# Patient Record
Sex: FEMALE | Race: WHITE | NOT HISPANIC OR LATINO | Employment: FULL TIME | URBAN - NONMETROPOLITAN AREA
[De-identification: names, ages, dates, MRNs, and addresses within clinical notes are randomized per-mention and may not be internally consistent; named-entity substitution may affect disease eponyms.]

---

## 2018-12-21 ENCOUNTER — OFFICE VISIT (OUTPATIENT)
Dept: FAMILY MEDICINE CLINIC | Facility: CLINIC | Age: 23
End: 2018-12-21

## 2018-12-21 VITALS
SYSTOLIC BLOOD PRESSURE: 158 MMHG | WEIGHT: 224 LBS | DIASTOLIC BLOOD PRESSURE: 116 MMHG | BODY MASS INDEX: 37.32 KG/M2 | HEIGHT: 65 IN

## 2018-12-21 DIAGNOSIS — I10 ESSENTIAL HYPERTENSION: Primary | ICD-10-CM

## 2018-12-21 DIAGNOSIS — R09.81 NASAL CONGESTION: ICD-10-CM

## 2018-12-21 PROBLEM — Z87.442 HISTORY OF KIDNEY STONES: Status: ACTIVE | Noted: 2018-12-21

## 2018-12-21 PROCEDURE — 99214 OFFICE O/P EST MOD 30 MIN: CPT | Performed by: FAMILY MEDICINE

## 2018-12-21 RX ORDER — LISINOPRIL 10 MG/1
10 TABLET ORAL DAILY
Qty: 30 TABLET | Refills: 5 | Status: SHIPPED | OUTPATIENT
Start: 2018-12-21 | End: 2019-03-07 | Stop reason: ALTCHOICE

## 2018-12-21 NOTE — PROGRESS NOTES
Subjective   Savanah Orozco is a 23 y.o. female who presents to the office to establish care.  Her blood pressure has been running extremely high.  She hasn't had any recent changes or started any new medications.  She also complains about some nasal congestion.    She has a very strong family history of heart disease, thyroid disease, and hypertension.  She has noted that she's gained quite a bit of weight recently.  She had some stress last summer and has put on about 20 pounds.    History of Present Illness   Hypertension   This is a new problem.  The problem has been waxing and waning since onset. Associated symptoms include anxiety, headaches and malaise/fatigue. Pertinent negatives include no blurred vision, chest pain, neck pain, orthopnea, palpitations, peripheral edema, PND, shortness of breath or sweats. There are no associated agents to hypertension. Risk factors for coronary artery disease include dyslipidemia and family history.     The following portions of the patient's history were reviewed and updated as appropriate: allergies, current medications, past family history, past medical history, past social history, past surgical history and problem list.    Review of Systems   Constitutional: Negative.    HENT: Negative.    Respiratory: Negative.  Negative for shortness of breath.    Cardiovascular: Negative.  Negative for chest pain.   Gastrointestinal: Negative.    Musculoskeletal: Negative.  Negative for myalgias.   Skin: Negative.    Allergic/Immunologic: Negative for immunocompromised state.   Neurological: Negative for dizziness, tremors, seizures, syncope, weakness and numbness.   Hematological: Negative.    Psychiatric/Behavioral: Negative for agitation, confusion, dysphoric mood and sleep disturbance. The patient is not nervous/anxious.    All other systems reviewed and are negative.      Objective   Physical Exam   Constitutional: She is oriented to person, place, and time. She appears  well-developed and well-nourished.   HENT:   Head: Normocephalic and atraumatic.   Nose: Nose normal.   Mouth/Throat: Oropharynx is clear and moist.   Eyes: Conjunctivae and EOM are normal. Pupils are equal, round, and reactive to light.   Neck: Normal range of motion. Neck supple. No JVD present. No tracheal deviation present. No thyromegaly present.   Cardiovascular: Normal rate, regular rhythm, normal heart sounds and intact distal pulses.   No murmur heard.  Pulmonary/Chest: Effort normal and breath sounds normal. She has no wheezes.   Abdominal: Soft. Bowel sounds are normal. She exhibits no distension. There is no tenderness.   Musculoskeletal: Normal range of motion. She exhibits no edema.   Lymphadenopathy:     She has no cervical adenopathy.   Neurological: She is alert and oriented to person, place, and time. Coordination normal.   Skin: Skin is warm and dry. No rash noted.   Psychiatric: She has a normal mood and affect.   Nursing note and vitals reviewed.      Assessment/Plan   Savanah was seen today for establish care.    Diagnoses and all orders for this visit:    Essential hypertension  -     CBC & Differential; Future  -     Comprehensive Metabolic Panel  -     TSH  -     T4, Free  -     Lipid Panel; Future    Nasal congestion    Other orders  -     lisinopril (PRINIVIL,ZESTRIL) 10 MG tablet; Take 1 tablet by mouth Daily.    We'll start lisinopril for hypertension and get labs as above.  We'll have her return in 2 weeks for results and consider further workup of blood pressure now well controlled.  She is advised to go straight to the emergency room or call 911 for any signs of acute neurologic change.    Recommend over-the-counter Flonase or Nasacort for the nasal congestion as this should not disturb her blood pressure.

## 2018-12-24 ENCOUNTER — LAB (OUTPATIENT)
Dept: LAB | Facility: OTHER | Age: 23
End: 2018-12-24

## 2018-12-24 DIAGNOSIS — I10 ESSENTIAL HYPERTENSION: ICD-10-CM

## 2018-12-24 LAB
ALBUMIN SERPL-MCNC: 4.3 G/DL (ref 3.5–5)
ALBUMIN/GLOB SERPL: 1.4 G/DL (ref 1.1–1.8)
ALP SERPL-CCNC: 84 U/L (ref 38–126)
ALT SERPL W P-5'-P-CCNC: 17 U/L
ANION GAP SERPL CALCULATED.3IONS-SCNC: 7 MMOL/L (ref 5–15)
AST SERPL-CCNC: 20 U/L (ref 14–36)
BASOPHILS # BLD AUTO: 0.04 10*3/MM3 (ref 0–0.2)
BASOPHILS NFR BLD AUTO: 0.5 % (ref 0–2)
BILIRUB SERPL-MCNC: 0.4 MG/DL (ref 0.2–1.3)
BUN BLD-MCNC: 12 MG/DL (ref 7–17)
BUN/CREAT SERPL: 16.2 (ref 7–25)
CALCIUM SPEC-SCNC: 9.3 MG/DL (ref 8.4–10.2)
CHLORIDE SERPL-SCNC: 110 MMOL/L (ref 98–107)
CHOLEST SERPL-MCNC: 178 MG/DL (ref 150–200)
CO2 SERPL-SCNC: 24 MMOL/L (ref 22–30)
CREAT BLD-MCNC: 0.74 MG/DL (ref 0.52–1.04)
DEPRECATED RDW RBC AUTO: 38.4 FL (ref 36.4–46.3)
EOSINOPHIL # BLD AUTO: 0.44 10*3/MM3 (ref 0–0.7)
EOSINOPHIL NFR BLD AUTO: 5.2 % (ref 0–7)
ERYTHROCYTE [DISTWIDTH] IN BLOOD BY AUTOMATED COUNT: 12.7 % (ref 11.5–14.5)
GFR SERPL CREATININE-BSD FRML MDRD: 96 ML/MIN/1.73 (ref 71–165)
GLOBULIN UR ELPH-MCNC: 3.1 GM/DL (ref 2.3–3.5)
GLUCOSE BLD-MCNC: 113 MG/DL (ref 74–99)
HCT VFR BLD AUTO: 41.4 % (ref 35–45)
HDLC SERPL-MCNC: 63 MG/DL (ref 40–59)
HGB BLD-MCNC: 13.6 G/DL (ref 12–15.5)
LDLC SERPL CALC-MCNC: 99 MG/DL
LDLC/HDLC SERPL: 1.58 {RATIO} (ref 0–3.22)
LYMPHOCYTES # BLD AUTO: 3.06 10*3/MM3 (ref 0.6–4.2)
LYMPHOCYTES NFR BLD AUTO: 36.3 % (ref 10–50)
MCH RBC QN AUTO: 28.1 PG (ref 26.5–34)
MCHC RBC AUTO-ENTMCNC: 32.9 G/DL (ref 31.4–36)
MCV RBC AUTO: 85.5 FL (ref 80–98)
MONOCYTES # BLD AUTO: 0.81 10*3/MM3 (ref 0–0.9)
MONOCYTES NFR BLD AUTO: 9.6 % (ref 0–12)
NEUTROPHILS # BLD AUTO: 4.09 10*3/MM3 (ref 2–8.6)
NEUTROPHILS NFR BLD AUTO: 48.4 % (ref 37–80)
PLATELET # BLD AUTO: 381 10*3/MM3 (ref 150–450)
PMV BLD AUTO: 10 FL (ref 8–12)
POTASSIUM BLD-SCNC: 4.4 MMOL/L (ref 3.4–5)
PROT SERPL-MCNC: 7.4 G/DL (ref 6.3–8.2)
RBC # BLD AUTO: 4.84 10*6/MM3 (ref 3.77–5.16)
SODIUM BLD-SCNC: 141 MMOL/L (ref 137–145)
T4 FREE SERPL-MCNC: 0.91 NG/DL (ref 0.78–2.19)
TRIGL SERPL-MCNC: 78 MG/DL
TSH SERPL DL<=0.05 MIU/L-ACNC: 2.78 MIU/ML (ref 0.46–4.68)
VLDLC SERPL-MCNC: 15.6 MG/DL
WBC NRBC COR # BLD: 8.44 10*3/MM3 (ref 3.2–9.8)

## 2018-12-24 PROCEDURE — 84439 ASSAY OF FREE THYROXINE: CPT | Performed by: FAMILY MEDICINE

## 2018-12-24 PROCEDURE — 80061 LIPID PANEL: CPT | Performed by: FAMILY MEDICINE

## 2018-12-24 PROCEDURE — 80053 COMPREHEN METABOLIC PANEL: CPT | Performed by: FAMILY MEDICINE

## 2018-12-24 PROCEDURE — 36415 COLL VENOUS BLD VENIPUNCTURE: CPT | Performed by: FAMILY MEDICINE

## 2018-12-24 PROCEDURE — 84443 ASSAY THYROID STIM HORMONE: CPT | Performed by: FAMILY MEDICINE

## 2018-12-24 PROCEDURE — 85025 COMPLETE CBC W/AUTO DIFF WBC: CPT | Performed by: FAMILY MEDICINE

## 2019-01-03 ENCOUNTER — OFFICE VISIT (OUTPATIENT)
Dept: FAMILY MEDICINE CLINIC | Facility: CLINIC | Age: 24
End: 2019-01-03

## 2019-01-03 VITALS
SYSTOLIC BLOOD PRESSURE: 154 MMHG | BODY MASS INDEX: 37.32 KG/M2 | HEIGHT: 65 IN | DIASTOLIC BLOOD PRESSURE: 110 MMHG | WEIGHT: 224 LBS

## 2019-01-03 DIAGNOSIS — I10 ESSENTIAL HYPERTENSION: Primary | ICD-10-CM

## 2019-01-03 PROCEDURE — 99213 OFFICE O/P EST LOW 20 MIN: CPT | Performed by: FAMILY MEDICINE

## 2019-01-03 RX ORDER — AMLODIPINE BESYLATE 5 MG/1
5 TABLET ORAL DAILY
Qty: 30 TABLET | Refills: 5 | Status: SHIPPED | OUTPATIENT
Start: 2019-01-03 | End: 2019-03-07

## 2019-01-03 NOTE — PROGRESS NOTES
Subjective   Savanah Orozco is a 23 y.o. female who presents to the office to follow-up on hypertension.  She's been on lisinopril 10 mg taking it daily her blood pressure has continued to be elevated.  She has not been checking it at home.    History of Present Illness   Hypertension   This is a new problem.  The problem has been waxing and waning since onset. Associated symptoms include anxiety, headaches and malaise/fatigue. Pertinent negatives include no blurred vision, chest pain, neck pain, orthopnea, palpitations, peripheral edema, PND, shortness of breath or sweats. There are no associated agents to hypertension. Risk factors for coronary artery disease include dyslipidemia and family history.     The following portions of the patient's history were reviewed and updated as appropriate: allergies, current medications, past family history, past medical history, past social history, past surgical history and problem list.    Review of Systems   Constitutional: Negative.    HENT: Negative.    Respiratory: Negative.  Negative for shortness of breath.    Cardiovascular: Negative.  Negative for chest pain.   Gastrointestinal: Negative.    Musculoskeletal: Negative.  Negative for myalgias.   Skin: Negative.    Allergic/Immunologic: Negative for immunocompromised state.   Neurological: Negative for dizziness, tremors, seizures, syncope, weakness and numbness.   Hematological: Negative.    Psychiatric/Behavioral: Negative for agitation, confusion, dysphoric mood and sleep disturbance. The patient is not nervous/anxious.    All other systems reviewed and are negative.      Objective   Physical Exam   Constitutional: She is oriented to person, place, and time. She appears well-developed and well-nourished.   HENT:   Head: Normocephalic and atraumatic.   Nose: Nose normal.   Mouth/Throat: Oropharynx is clear and moist.   Eyes: Conjunctivae and EOM are normal. Pupils are equal, round, and reactive to light.   Neck:  Normal range of motion. Neck supple. No JVD present. No tracheal deviation present. No thyromegaly present.   Cardiovascular: Normal rate, regular rhythm, normal heart sounds and intact distal pulses.   No murmur heard.  Pulmonary/Chest: Effort normal and breath sounds normal. She has no wheezes.   Abdominal: Soft. Bowel sounds are normal. She exhibits no distension. There is no tenderness.   Musculoskeletal: Normal range of motion. She exhibits no edema.   Lymphadenopathy:     She has no cervical adenopathy.   Neurological: She is alert and oriented to person, place, and time. Coordination normal.   Skin: Skin is warm and dry. No rash noted.   Psychiatric: She has a normal mood and affect.   Nursing note and vitals reviewed.      Assessment/Plan   Savanah was seen today for follow-up.    Diagnoses and all orders for this visit:    Essential hypertension  -     CT Angiogram Renal; Future    Other orders  -     amLODIPine (NORVASC) 5 MG tablet; Take 1 tablet by mouth Daily.    Will order the CT of the renal arteries, and add amlodipine for hypertension and check back with me in 2 weeks or sooner for problems.  Patient is advised to seem immediate medical attention for any acute neurologic changes or stroke-like symptoms.            This document has been electronically signed by Liliana Lowery MD on January 3, 2019 3:42 PM

## 2019-01-17 DIAGNOSIS — Z32.00 ENCOUNTER FOR PREGNANCY TEST, RESULT UNKNOWN: Primary | ICD-10-CM

## 2019-01-18 ENCOUNTER — HOSPITAL ENCOUNTER (OUTPATIENT)
Dept: CT IMAGING | Facility: HOSPITAL | Age: 24
Discharge: HOME | End: 2019-01-18
Admitting: FAMILY MEDICINE

## 2019-01-18 ENCOUNTER — LAB (OUTPATIENT)
Dept: LAB | Facility: HOSPITAL | Age: 24
End: 2019-01-18

## 2019-01-18 DIAGNOSIS — Z32.00 ENCOUNTER FOR PREGNANCY TEST, RESULT UNKNOWN: ICD-10-CM

## 2019-01-18 DIAGNOSIS — I10 ESSENTIAL HYPERTENSION: ICD-10-CM

## 2019-01-18 LAB — B-HCG UR QL: NEGATIVE

## 2019-01-18 PROCEDURE — 0 IOPAMIDOL PER 1 ML: Performed by: FAMILY MEDICINE

## 2019-01-18 PROCEDURE — 74175 CTA ABDOMEN W/CONTRAST: CPT

## 2019-01-18 PROCEDURE — 81025 URINE PREGNANCY TEST: CPT

## 2019-01-18 RX ADMIN — IOPAMIDOL 90 ML: 755 INJECTION, SOLUTION INTRAVENOUS at 14:10

## 2019-01-25 ENCOUNTER — OFFICE VISIT (OUTPATIENT)
Dept: FAMILY MEDICINE CLINIC | Facility: CLINIC | Age: 24
End: 2019-01-25

## 2019-01-25 VITALS
WEIGHT: 224 LBS | SYSTOLIC BLOOD PRESSURE: 140 MMHG | DIASTOLIC BLOOD PRESSURE: 98 MMHG | HEIGHT: 65 IN | BODY MASS INDEX: 37.32 KG/M2

## 2019-01-25 DIAGNOSIS — R10.2 PELVIC PAIN: ICD-10-CM

## 2019-01-25 DIAGNOSIS — I10 ESSENTIAL HYPERTENSION: Primary | ICD-10-CM

## 2019-01-25 PROCEDURE — 99214 OFFICE O/P EST MOD 30 MIN: CPT | Performed by: FAMILY MEDICINE

## 2019-01-25 RX ORDER — AMLODIPINE BESYLATE AND BENAZEPRIL HYDROCHLORIDE 5; 20 MG/1; MG/1
1 CAPSULE ORAL DAILY
Qty: 30 CAPSULE | Refills: 5 | Status: SHIPPED | OUTPATIENT
Start: 2019-01-25 | End: 2019-03-04 | Stop reason: SDUPTHER

## 2019-01-25 NOTE — PROGRESS NOTES
Subjective   Savanah Orozco is a 23 y.o. female who presents to the office to follow-up on hypertension.  She's on amlodipine 5 mg, lisinopril 10.  The blood pressure is improved but is still not to goal.    Also she has been having some pelvic pain on the left side.  She has history of some ovarian cysts.  The pain is worse at night but it persists all day and has been present for months.  She has had some irregular menstrual cycles.  History of Present Illness   Hypertension   This is a new problem.  The problem has been waxing and waning since onset. Associated symptoms include anxiety, headaches and malaise/fatigue. Pertinent negatives include no blurred vision, chest pain, neck pain, orthopnea, palpitations, peripheral edema, PND, shortness of breath or sweats. There are no associated agents to hypertension. Risk factors for coronary artery disease include dyslipidemia and family history.     The following portions of the patient's history were reviewed and updated as appropriate: allergies, current medications, past family history, past medical history, past social history, past surgical history and problem list.    Review of Systems   Constitutional: Negative.    HENT: Negative.    Respiratory: Negative.  Negative for shortness of breath.    Cardiovascular: Negative.  Negative for chest pain.   Gastrointestinal: Negative.    Musculoskeletal: Negative.  Negative for myalgias.   Skin: Negative.    Allergic/Immunologic: Negative for immunocompromised state.   Neurological: Negative for dizziness, tremors, seizures, syncope, weakness and numbness.   Hematological: Negative.    Psychiatric/Behavioral: Negative for agitation, confusion, dysphoric mood and sleep disturbance. The patient is not nervous/anxious.    All other systems reviewed and are negative.      Objective   Physical Exam   Constitutional: She is oriented to person, place, and time. She appears well-developed and well-nourished.   HENT:   Head:  Normocephalic and atraumatic.   Nose: Nose normal.   Mouth/Throat: Oropharynx is clear and moist.   Eyes: Conjunctivae and EOM are normal. Pupils are equal, round, and reactive to light.   Neck: Normal range of motion. Neck supple. No JVD present. No tracheal deviation present. No thyromegaly present.   Cardiovascular: Normal rate, regular rhythm, normal heart sounds and intact distal pulses.   No murmur heard.  Pulmonary/Chest: Effort normal and breath sounds normal. She has no wheezes.   Abdominal: Soft. Bowel sounds are normal. She exhibits no distension. There is no tenderness.   Musculoskeletal: Normal range of motion. She exhibits no edema.   Lymphadenopathy:     She has no cervical adenopathy.   Neurological: She is alert and oriented to person, place, and time. Coordination normal.   Skin: Skin is warm and dry. No rash noted.   Psychiatric: She has a normal mood and affect.   Nursing note and vitals reviewed.      Assessment/Plan   Savanah was seen today for follow-up.    Diagnoses and all orders for this visit:    Essential hypertension    Pelvic pain  -     US Pelvis Complete; Future    Other orders  -     amLODIPine-benazepril (LOTREL) 5-20 MG per capsule; Take 1 capsule by mouth Daily.    Switch to lotrel with an increase in the ACE inhibitor.  Recheck blood pressure in a month.  She's gone to get a cuff and followed at home as well    We'll order ultrasound of the pelvis and follow-up with me afterwards concerning the pelvic pain.           This document has been electronically signed by Liliana Lowery MD on January 25, 2019 8:39 AM

## 2019-03-05 RX ORDER — AMLODIPINE BESYLATE AND BENAZEPRIL HYDROCHLORIDE 5; 20 MG/1; MG/1
1 CAPSULE ORAL DAILY
Qty: 30 CAPSULE | Refills: 5 | Status: SHIPPED | OUTPATIENT
Start: 2019-03-05 | End: 2019-08-27 | Stop reason: SDUPTHER

## 2019-03-07 ENCOUNTER — OFFICE VISIT (OUTPATIENT)
Dept: FAMILY MEDICINE CLINIC | Facility: CLINIC | Age: 24
End: 2019-03-07

## 2019-03-07 VITALS
WEIGHT: 224 LBS | HEIGHT: 65 IN | DIASTOLIC BLOOD PRESSURE: 94 MMHG | BODY MASS INDEX: 37.32 KG/M2 | SYSTOLIC BLOOD PRESSURE: 142 MMHG

## 2019-03-07 DIAGNOSIS — I10 ESSENTIAL HYPERTENSION: Primary | ICD-10-CM

## 2019-03-07 PROCEDURE — 99213 OFFICE O/P EST LOW 20 MIN: CPT | Performed by: FAMILY MEDICINE

## 2019-03-07 RX ORDER — METOPROLOL SUCCINATE 50 MG/1
50 TABLET, EXTENDED RELEASE ORAL DAILY
Qty: 30 TABLET | Refills: 5 | Status: SHIPPED | OUTPATIENT
Start: 2019-03-07 | End: 2019-04-12 | Stop reason: SDUPTHER

## 2019-03-07 NOTE — PROGRESS NOTES
Subjective   Savanah Orozco is a 23 y.o. female who presents to the office to follow-up on hypertension.  Blood pressure is still elevated.  She has had a CT angiogram of the renal arteries which was negative.  There is a very strong family history of hypertension.  She has been taking her medicine as directed.  When she checks it at home is running about the same as it is here, still high.  She said mild swelling of the ankles since on the amlodipine.  She does have a history of kidney stones and thus we are trying to avoid diuretics.    History of Present Illness   Hypertension   This is a new problem.  The problem has been waxing and waning since onset. Associated symptoms include anxiety, headaches and malaise/fatigue. Pertinent negatives include no blurred vision, chest pain, neck pain, orthopnea, palpitations, peripheral edema, PND, shortness of breath or sweats. There are no associated agents to hypertension. Risk factors for coronary artery disease include dyslipidemia and family history.     The following portions of the patient's history were reviewed and updated as appropriate: allergies, current medications, past family history, past medical history, past social history, past surgical history and problem list.    Review of Systems   Constitutional: Negative.    HENT: Negative.    Respiratory: Negative.  Negative for shortness of breath.    Cardiovascular: Negative.  Negative for chest pain.   Gastrointestinal: Negative.    Musculoskeletal: Negative.  Negative for myalgias.   Skin: Negative.    Allergic/Immunologic: Negative for immunocompromised state.   Neurological: Negative for dizziness, tremors, seizures, syncope, weakness and numbness.   Hematological: Negative.    Psychiatric/Behavioral: Negative for agitation, confusion, dysphoric mood and sleep disturbance. The patient is not nervous/anxious.    All other systems reviewed and are negative.      Objective   Physical Exam   Constitutional: She  is oriented to person, place, and time. She appears well-developed and well-nourished.   HENT:   Head: Normocephalic and atraumatic.   Nose: Nose normal.   Mouth/Throat: Oropharynx is clear and moist.   Eyes: Conjunctivae and EOM are normal. Pupils are equal, round, and reactive to light.   Neck: Normal range of motion. Neck supple. No JVD present. No tracheal deviation present. No thyromegaly present.   Cardiovascular: Normal rate, regular rhythm, normal heart sounds and intact distal pulses.   No murmur heard.  Pulmonary/Chest: Effort normal and breath sounds normal. She has no wheezes.   Abdominal: Soft. Bowel sounds are normal. She exhibits no distension. There is no tenderness.   Musculoskeletal: Normal range of motion. She exhibits no edema.   Lymphadenopathy:     She has no cervical adenopathy.   Neurological: She is alert and oriented to person, place, and time. Coordination normal.   Skin: Skin is warm and dry. No rash noted.   Psychiatric: She has a normal mood and affect.   Nursing note and vitals reviewed.      Assessment/Plan   Savanah was seen today for follow-up.    Diagnoses and all orders for this visit:    Essential hypertension    Other orders  -     metoprolol succinate XL (TOPROL XL) 50 MG 24 hr tablet; Take 1 tablet by mouth Daily.    We will add metoprolol.  Continue with the Lotrel as well and recheck with me in 4 weeks.  Continue to monitor blood pressures at home on a regular basis.    Patient is advised to seek immediate medical attention for any acute neurologic changes or stroke-like symptoms.         This document has been electronically signed by Liliana Lowery MD on March 7, 2019 2:42 PM

## 2019-04-12 RX ORDER — METOPROLOL SUCCINATE 50 MG/1
50 TABLET, EXTENDED RELEASE ORAL DAILY
Qty: 30 TABLET | Refills: 5 | Status: SHIPPED | OUTPATIENT
Start: 2019-04-12 | End: 2019-05-03 | Stop reason: SDUPTHER

## 2019-05-06 RX ORDER — METOPROLOL SUCCINATE 50 MG/1
50 TABLET, EXTENDED RELEASE ORAL DAILY
Qty: 30 TABLET | Refills: 5 | Status: SHIPPED | OUTPATIENT
Start: 2019-05-06 | End: 2019-08-27 | Stop reason: SDUPTHER

## 2019-08-27 RX ORDER — METOPROLOL SUCCINATE 50 MG/1
50 TABLET, EXTENDED RELEASE ORAL DAILY
Qty: 30 TABLET | Refills: 5 | Status: SHIPPED | OUTPATIENT
Start: 2019-08-27 | End: 2019-10-01 | Stop reason: SDUPTHER

## 2019-08-27 RX ORDER — AMLODIPINE BESYLATE AND BENAZEPRIL HYDROCHLORIDE 5; 20 MG/1; MG/1
1 CAPSULE ORAL DAILY
Qty: 30 CAPSULE | Refills: 5 | Status: SHIPPED | OUTPATIENT
Start: 2019-08-27 | End: 2019-10-01 | Stop reason: SDUPTHER

## 2019-08-27 RX ORDER — AMLODIPINE BESYLATE AND BENAZEPRIL HYDROCHLORIDE 5; 20 MG/1; MG/1
1 CAPSULE ORAL DAILY
Qty: 30 CAPSULE | Refills: 5 | Status: CANCELLED | OUTPATIENT
Start: 2019-08-27

## 2019-09-06 ENCOUNTER — OFFICE VISIT (OUTPATIENT)
Dept: FAMILY MEDICINE CLINIC | Facility: CLINIC | Age: 24
End: 2019-09-06

## 2019-09-06 VITALS
SYSTOLIC BLOOD PRESSURE: 138 MMHG | BODY MASS INDEX: 38.65 KG/M2 | WEIGHT: 232 LBS | DIASTOLIC BLOOD PRESSURE: 100 MMHG | HEIGHT: 65 IN

## 2019-09-06 DIAGNOSIS — I10 ESSENTIAL HYPERTENSION: Primary | ICD-10-CM

## 2019-09-06 DIAGNOSIS — M77.12 LEFT TENNIS ELBOW: ICD-10-CM

## 2019-09-06 PROCEDURE — 99213 OFFICE O/P EST LOW 20 MIN: CPT | Performed by: FAMILY MEDICINE

## 2019-09-06 NOTE — PROGRESS NOTES
Subjective   Savanah Orozco is a 24 y.o. female who presents to the office to follow-up on hypertension.  She is tolerating her medications well.  She does note some occasional mild swelling of the ankles but it is tolerable and her blood pressure seems to be well controlled.  She has some pain in her left elbow.  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  Is on the left lateral side is been occurring for the past couple months.    History of Present Illness   Hypertension   This is a new problem.  The problem has been waxing and waning since onset. Associated symptoms include anxiety, headaches and malaise/fatigue. Pertinent negatives include no blurred vision, chest pain, neck pain, orthopnea, palpitations, peripheral edema, PND, shortness of breath or sweats. There are no associated agents to hypertension. Risk factors for coronary artery disease include dyslipidemia and family history.     The following portions of the patient's history were reviewed and updated as appropriate: allergies, current medications, past family history, past medical history, past social history, past surgical history and problem list.    Review of Systems   Constitutional: Negative.    HENT: Negative.    Respiratory: Negative.    Cardiovascular: Negative.    Gastrointestinal: Negative.    Musculoskeletal: Negative.  Negative for myalgias.   Skin: Negative.    Allergic/Immunologic: Negative for immunocompromised state.   Neurological: Negative for dizziness, tremors, seizures, syncope, weakness and numbness.   Hematological: Negative.    Psychiatric/Behavioral: Negative for agitation, confusion, dysphoric mood and sleep disturbance. The patient is not nervous/anxious.    All other systems reviewed and are negative.      Objective   Physical Exam   Constitutional: She is oriented to person, place, and time. She appears well-developed and well-nourished.   HENT:   Head: Normocephalic and atraumatic.   Nose: Nose normal.    Mouth/Throat: Oropharynx is clear and moist.   Eyes: Conjunctivae and EOM are normal. Pupils are equal, round, and reactive to light.   Neck: Normal range of motion. Neck supple. No JVD present. No tracheal deviation present. No thyromegaly present.   Cardiovascular: Normal rate, regular rhythm, normal heart sounds and intact distal pulses.   No murmur heard.  Pulmonary/Chest: Effort normal and breath sounds normal. She has no wheezes.   Abdominal: Soft. Bowel sounds are normal. She exhibits no distension. There is no tenderness.   Musculoskeletal: Normal range of motion. She exhibits no edema.   Mild swelling and tenderness over the left lateral epicondyle   Lymphadenopathy:     She has no cervical adenopathy.   Neurological: She is alert and oriented to person, place, and time. Coordination normal.   Skin: Skin is warm and dry. No rash noted.   Psychiatric: She has a normal mood and affect.   Nursing note and vitals reviewed.      Assessment/Plan   Savanah was seen today for hypertension.    Diagnoses and all orders for this visit:    Essential hypertension    Left tennis elbow    Continue with current medications for hypertension monitor blood pressures regularly check back in 3 months    Give a handout on tennis elbow exercises recommended a tennis elbow band and anti-inflammatories over-the-counter.  If not improving within 4 to 6 weeks return to consider injection            This document has been electronically signed by Liliana Lowery MD on September 6, 2019 10:15 AM

## 2019-10-01 RX ORDER — METOPROLOL SUCCINATE 50 MG/1
50 TABLET, EXTENDED RELEASE ORAL DAILY
Qty: 30 TABLET | Refills: 5 | Status: SHIPPED | OUTPATIENT
Start: 2019-10-01 | End: 2019-10-31 | Stop reason: SDUPTHER

## 2019-10-01 RX ORDER — AMLODIPINE BESYLATE AND BENAZEPRIL HYDROCHLORIDE 5; 20 MG/1; MG/1
1 CAPSULE ORAL DAILY
Qty: 30 CAPSULE | Refills: 5 | Status: SHIPPED | OUTPATIENT
Start: 2019-10-01 | End: 2019-10-31 | Stop reason: SDUPTHER

## 2019-11-01 RX ORDER — METOPROLOL SUCCINATE 50 MG/1
50 TABLET, EXTENDED RELEASE ORAL DAILY
Qty: 30 TABLET | Refills: 5 | Status: SHIPPED | OUTPATIENT
Start: 2019-11-01 | End: 2020-03-20 | Stop reason: SDUPTHER

## 2019-11-01 RX ORDER — AMLODIPINE BESYLATE AND BENAZEPRIL HYDROCHLORIDE 5; 20 MG/1; MG/1
1 CAPSULE ORAL DAILY
Qty: 30 CAPSULE | Refills: 5 | Status: SHIPPED | OUTPATIENT
Start: 2019-11-01 | End: 2020-03-20 | Stop reason: SDUPTHER

## 2019-12-06 ENCOUNTER — OFFICE VISIT (OUTPATIENT)
Dept: FAMILY MEDICINE CLINIC | Facility: CLINIC | Age: 24
End: 2019-12-06

## 2019-12-06 ENCOUNTER — LAB (OUTPATIENT)
Dept: LAB | Facility: OTHER | Age: 24
End: 2019-12-06

## 2019-12-06 VITALS — DIASTOLIC BLOOD PRESSURE: 94 MMHG | HEIGHT: 65 IN | BODY MASS INDEX: 38.61 KG/M2 | SYSTOLIC BLOOD PRESSURE: 136 MMHG

## 2019-12-06 DIAGNOSIS — I10 ESSENTIAL HYPERTENSION: Primary | ICD-10-CM

## 2019-12-06 DIAGNOSIS — I10 ESSENTIAL HYPERTENSION: ICD-10-CM

## 2019-12-06 LAB
ALBUMIN SERPL-MCNC: 4.4 G/DL (ref 3.5–5)
ALBUMIN/GLOB SERPL: 1.3 G/DL (ref 1.1–1.8)
ALP SERPL-CCNC: 67 U/L (ref 38–126)
ALT SERPL W P-5'-P-CCNC: 15 U/L
ANION GAP SERPL CALCULATED.3IONS-SCNC: 9 MMOL/L (ref 5–15)
AST SERPL-CCNC: 20 U/L (ref 14–36)
BASOPHILS # BLD AUTO: 0.05 10*3/MM3 (ref 0–0.2)
BASOPHILS NFR BLD AUTO: 0.6 % (ref 0–1.5)
BILIRUB SERPL-MCNC: 0.5 MG/DL (ref 0.2–1.3)
BUN BLD-MCNC: 11 MG/DL (ref 7–23)
BUN/CREAT SERPL: 14.7 (ref 7–25)
CALCIUM SPEC-SCNC: 9.5 MG/DL (ref 8.4–10.2)
CHLORIDE SERPL-SCNC: 105 MMOL/L (ref 101–112)
CHOLEST SERPL-MCNC: 193 MG/DL (ref 150–200)
CO2 SERPL-SCNC: 26 MMOL/L (ref 22–30)
CREAT BLD-MCNC: 0.75 MG/DL (ref 0.52–1.04)
DEPRECATED RDW RBC AUTO: 39.6 FL (ref 37–54)
EOSINOPHIL # BLD AUTO: 0.19 10*3/MM3 (ref 0–0.4)
EOSINOPHIL NFR BLD AUTO: 2.1 % (ref 0.3–6.2)
ERYTHROCYTE [DISTWIDTH] IN BLOOD BY AUTOMATED COUNT: 12.6 % (ref 12.3–15.4)
GFR SERPL CREATININE-BSD FRML MDRD: 94 ML/MIN/1.73 (ref 71–165)
GLOBULIN UR ELPH-MCNC: 3.3 GM/DL (ref 2.3–3.5)
GLUCOSE BLD-MCNC: 117 MG/DL (ref 70–99)
HCT VFR BLD AUTO: 41.5 % (ref 34–46.6)
HDLC SERPL-MCNC: 55 MG/DL (ref 40–59)
HGB BLD-MCNC: 13.7 G/DL (ref 12–15.9)
LDLC SERPL CALC-MCNC: 115 MG/DL
LDLC/HDLC SERPL: 2.1 {RATIO} (ref 0–3.22)
LYMPHOCYTES # BLD AUTO: 2.48 10*3/MM3 (ref 0.7–3.1)
LYMPHOCYTES NFR BLD AUTO: 27.3 % (ref 19.6–45.3)
MCH RBC QN AUTO: 28.7 PG (ref 26.6–33)
MCHC RBC AUTO-ENTMCNC: 33 G/DL (ref 31.5–35.7)
MCV RBC AUTO: 87 FL (ref 79–97)
MONOCYTES # BLD AUTO: 0.81 10*3/MM3 (ref 0.1–0.9)
MONOCYTES NFR BLD AUTO: 8.9 % (ref 5–12)
NEUTROPHILS # BLD AUTO: 5.55 10*3/MM3 (ref 1.7–7)
NEUTROPHILS NFR BLD AUTO: 61.1 % (ref 42.7–76)
PLATELET # BLD AUTO: 383 10*3/MM3 (ref 140–450)
PMV BLD AUTO: 10.7 FL (ref 6–12)
POTASSIUM BLD-SCNC: 4.2 MMOL/L (ref 3.4–5)
PROT SERPL-MCNC: 7.7 G/DL (ref 6.3–8.6)
RBC # BLD AUTO: 4.77 10*6/MM3 (ref 3.77–5.28)
SODIUM BLD-SCNC: 140 MMOL/L (ref 137–145)
TRIGL SERPL-MCNC: 113 MG/DL
VLDLC SERPL-MCNC: 22.6 MG/DL
WBC NRBC COR # BLD: 9.08 10*3/MM3 (ref 3.4–10.8)

## 2019-12-06 PROCEDURE — 84439 ASSAY OF FREE THYROXINE: CPT | Performed by: FAMILY MEDICINE

## 2019-12-06 PROCEDURE — 80061 LIPID PANEL: CPT | Performed by: FAMILY MEDICINE

## 2019-12-06 PROCEDURE — 99213 OFFICE O/P EST LOW 20 MIN: CPT | Performed by: FAMILY MEDICINE

## 2019-12-06 PROCEDURE — 36415 COLL VENOUS BLD VENIPUNCTURE: CPT | Performed by: FAMILY MEDICINE

## 2019-12-06 PROCEDURE — 80053 COMPREHEN METABOLIC PANEL: CPT | Performed by: FAMILY MEDICINE

## 2019-12-06 PROCEDURE — 85025 COMPLETE CBC W/AUTO DIFF WBC: CPT | Performed by: FAMILY MEDICINE

## 2019-12-06 PROCEDURE — 84443 ASSAY THYROID STIM HORMONE: CPT | Performed by: FAMILY MEDICINE

## 2019-12-06 RX ORDER — CLONIDINE HYDROCHLORIDE 0.1 MG/1
TABLET ORAL
Qty: 60 TABLET | Refills: 5 | Status: SHIPPED | OUTPATIENT
Start: 2019-12-06 | End: 2021-02-03 | Stop reason: SDUPTHER

## 2019-12-06 NOTE — PROGRESS NOTES
"Subjective   Savanah Kanwal Orozco is a 24 y.o. female who presents to the office to follow-up on hypertension.  The metoprolol makes her a bit fatigued but it is tolerable.  Her blood pressures are doing better but still not quite to goal.  She is really not having any symptoms of hypertension at this point such as headaches or chest pain    History of Present Illness   Hypertension   This is a new problem.  The problem has been waxing and waning since onset. Associated symptoms include anxiety, headaches and malaise/fatigue. Pertinent negatives include no blurred vision, chest pain, neck pain, orthopnea, palpitations, peripheral edema, PND, shortness of breath or sweats. There are no associated agents to hypertension. Risk factors for coronary artery disease include dyslipidemia and family history.     The following portions of the patient's history were reviewed and updated as appropriate: allergies, current medications, past family history, past medical history, past social history, past surgical history and problem list.    Review of Systems   Constitutional: Negative.    HENT: Negative.    Respiratory: Negative.    Cardiovascular: Negative.    Gastrointestinal: Negative.    Musculoskeletal: Negative.  Negative for myalgias.   Skin: Negative.    Allergic/Immunologic: Negative for immunocompromised state.   Neurological: Negative for dizziness, tremors, seizures, syncope, weakness and numbness.   Hematological: Negative.    Psychiatric/Behavioral: Negative for agitation, confusion, dysphoric mood and sleep disturbance. The patient is not nervous/anxious.    All other systems reviewed and are negative.      Objective    Vitals:    12/06/19 0906   BP: 136/94   Weight: Comment: refused   Height: 165.1 cm (65\")   PainSc: 0-No pain     Body mass index is 38.61 kg/m².    Physical Exam   Constitutional: She is oriented to person, place, and time. She appears well-developed and well-nourished.   HENT:   Head: Normocephalic " and atraumatic.   Nose: Nose normal.   Mouth/Throat: Oropharynx is clear and moist.   Eyes: Conjunctivae and EOM are normal. Pupils are equal, round, and reactive to light.   Neck: Normal range of motion. Neck supple. No JVD present. No tracheal deviation present. No thyromegaly present.   Cardiovascular: Normal rate, regular rhythm, normal heart sounds and intact distal pulses.   No murmur heard.  Pulmonary/Chest: Effort normal and breath sounds normal. She has no wheezes.   Abdominal: Soft. Bowel sounds are normal. She exhibits no distension. There is no tenderness.   Musculoskeletal: Normal range of motion. She exhibits no edema.        Lymphadenopathy:     She has no cervical adenopathy.   Neurological: She is alert and oriented to person, place, and time. Coordination normal.   Skin: Skin is warm and dry. No rash noted.   Psychiatric: She has a normal mood and affect.   Nursing note and vitals reviewed.      Assessment/Plan   Savanah was seen today for hypertension.    Diagnoses and all orders for this visit:    Essential hypertension    BMI 38.0-38.9,adult    Other orders  -     cloNIDine (CATAPRES) 0.1 MG tablet; 1 tablet up to twice daily prn systolic blood pressure >100    Will add clonidine to take for markedly elevated blood pressures but hopefully with weight loss and lifestyle changes we can avoid adding another medication.  We will continue to monitor closely in a month and she is advised to check blood pressures daily in the meantime.  Patient is advised to seek immediate medical attention for any acute neurologic changes or stroke-like symptoms.     Discussed the patient's BMI with her. BMI is above normal parameters. Follow-up plan includes:  educational material and nutrition counseling.          This document has been electronically signed by Liliana Lowery MD on December 6, 2019 9:18 AM

## 2019-12-07 LAB
T4 FREE SERPL-MCNC: 1.13 NG/DL (ref 0.93–1.7)
TSH SERPL DL<=0.05 MIU/L-ACNC: 1.4 UIU/ML (ref 0.27–4.2)

## 2019-12-09 NOTE — PROGRESS NOTES
Please call the patient regarding her abnormal result.  Sugar is mildly elevated at 117 but not diabetic.  Thyroid is okay, cholesterol has gone up about 20 points from last year.  Need to really watch diet and exercise and check in 6 months and if not better may need to consider medication.

## 2020-03-20 RX ORDER — METOPROLOL SUCCINATE 50 MG/1
50 TABLET, EXTENDED RELEASE ORAL DAILY
Qty: 30 TABLET | Refills: 5 | Status: SHIPPED | OUTPATIENT
Start: 2020-03-20 | End: 2020-11-12

## 2020-03-20 RX ORDER — AMLODIPINE BESYLATE AND BENAZEPRIL HYDROCHLORIDE 5; 20 MG/1; MG/1
1 CAPSULE ORAL DAILY
Qty: 30 CAPSULE | Refills: 5 | Status: SHIPPED | OUTPATIENT
Start: 2020-03-20 | End: 2020-08-27 | Stop reason: ALTCHOICE

## 2020-08-27 DIAGNOSIS — I10 ESSENTIAL HYPERTENSION: Primary | ICD-10-CM

## 2020-08-27 RX ORDER — BENAZEPRIL HYDROCHLORIDE 20 MG/1
20 TABLET ORAL DAILY
Qty: 30 TABLET | Refills: 5 | Status: SHIPPED | OUTPATIENT
Start: 2020-08-27 | End: 2021-02-20 | Stop reason: SDUPTHER

## 2020-08-27 RX ORDER — AMLODIPINE BESYLATE 5 MG/1
5 TABLET ORAL DAILY
Qty: 30 TABLET | Refills: 5 | Status: SHIPPED | OUTPATIENT
Start: 2020-08-27 | End: 2020-12-28 | Stop reason: SDUPTHER

## 2020-09-04 ENCOUNTER — LAB (OUTPATIENT)
Dept: LAB | Facility: OTHER | Age: 25
End: 2020-09-04

## 2020-09-04 DIAGNOSIS — I10 ESSENTIAL HYPERTENSION: ICD-10-CM

## 2020-09-04 LAB
ALBUMIN SERPL-MCNC: 4.2 G/DL (ref 3.5–5)
ALBUMIN/GLOB SERPL: 1.2 G/DL (ref 1.1–1.8)
ALP SERPL-CCNC: 70 U/L (ref 38–126)
ALT SERPL W P-5'-P-CCNC: 15 U/L
ANION GAP SERPL CALCULATED.3IONS-SCNC: 9 MMOL/L (ref 5–15)
AST SERPL-CCNC: 20 U/L (ref 14–36)
BASOPHILS # BLD AUTO: 0.04 10*3/MM3 (ref 0–0.2)
BASOPHILS NFR BLD AUTO: 0.5 % (ref 0–1.5)
BILIRUB SERPL-MCNC: 0.4 MG/DL (ref 0.2–1.3)
BUN SERPL-MCNC: 15 MG/DL (ref 7–23)
BUN/CREAT SERPL: 22.7 (ref 7–25)
CALCIUM SPEC-SCNC: 9.2 MG/DL (ref 8.4–10.2)
CHLORIDE SERPL-SCNC: 106 MMOL/L (ref 101–112)
CHOLEST SERPL-MCNC: 215 MG/DL (ref 150–200)
CO2 SERPL-SCNC: 21 MMOL/L (ref 22–30)
CREAT SERPL-MCNC: 0.66 MG/DL (ref 0.52–1.04)
DEPRECATED RDW RBC AUTO: 40.9 FL (ref 37–54)
EOSINOPHIL # BLD AUTO: 0.23 10*3/MM3 (ref 0–0.4)
EOSINOPHIL NFR BLD AUTO: 3 % (ref 0.3–6.2)
ERYTHROCYTE [DISTWIDTH] IN BLOOD BY AUTOMATED COUNT: 12.8 % (ref 12.3–15.4)
GFR SERPL CREATININE-BSD FRML MDRD: 108 ML/MIN/1.73 (ref 71–165)
GLOBULIN UR ELPH-MCNC: 3.5 GM/DL (ref 2.3–3.5)
GLUCOSE SERPL-MCNC: 106 MG/DL (ref 70–99)
HCT VFR BLD AUTO: 42 % (ref 34–46.6)
HDLC SERPL-MCNC: 62 MG/DL (ref 40–59)
HGB BLD-MCNC: 13.8 G/DL (ref 12–15.9)
LDLC SERPL CALC-MCNC: 135 MG/DL
LDLC/HDLC SERPL: 2.17 {RATIO} (ref 0–3.22)
LYMPHOCYTES # BLD AUTO: 2.89 10*3/MM3 (ref 0.7–3.1)
LYMPHOCYTES NFR BLD AUTO: 37.5 % (ref 19.6–45.3)
MCH RBC QN AUTO: 29.1 PG (ref 26.6–33)
MCHC RBC AUTO-ENTMCNC: 32.9 G/DL (ref 31.5–35.7)
MCV RBC AUTO: 88.4 FL (ref 79–97)
MONOCYTES # BLD AUTO: 0.66 10*3/MM3 (ref 0.1–0.9)
MONOCYTES NFR BLD AUTO: 8.6 % (ref 5–12)
NEUTROPHILS NFR BLD AUTO: 3.88 10*3/MM3 (ref 1.7–7)
NEUTROPHILS NFR BLD AUTO: 50.4 % (ref 42.7–76)
PLATELET # BLD AUTO: 391 10*3/MM3 (ref 140–450)
PMV BLD AUTO: 10.3 FL (ref 6–12)
POTASSIUM SERPL-SCNC: 4.3 MMOL/L (ref 3.4–5)
PROT SERPL-MCNC: 7.7 G/DL (ref 6.3–8.6)
RBC # BLD AUTO: 4.75 10*6/MM3 (ref 3.77–5.28)
SODIUM SERPL-SCNC: 136 MMOL/L (ref 137–145)
T4 FREE SERPL-MCNC: 1.15 NG/DL (ref 0.93–1.7)
TRIGL SERPL-MCNC: 92 MG/DL
TSH SERPL DL<=0.05 MIU/L-ACNC: 1.85 UIU/ML (ref 0.27–4.2)
VLDLC SERPL-MCNC: 18.4 MG/DL
WBC # BLD AUTO: 7.7 10*3/MM3 (ref 3.4–10.8)

## 2020-09-04 PROCEDURE — 84439 ASSAY OF FREE THYROXINE: CPT | Performed by: FAMILY MEDICINE

## 2020-09-04 PROCEDURE — 84443 ASSAY THYROID STIM HORMONE: CPT | Performed by: FAMILY MEDICINE

## 2020-09-04 PROCEDURE — 36415 COLL VENOUS BLD VENIPUNCTURE: CPT | Performed by: FAMILY MEDICINE

## 2020-09-04 PROCEDURE — 85025 COMPLETE CBC W/AUTO DIFF WBC: CPT | Performed by: FAMILY MEDICINE

## 2020-09-04 PROCEDURE — 80053 COMPREHEN METABOLIC PANEL: CPT | Performed by: FAMILY MEDICINE

## 2020-09-04 PROCEDURE — 80061 LIPID PANEL: CPT | Performed by: FAMILY MEDICINE

## 2020-09-10 ENCOUNTER — OFFICE VISIT (OUTPATIENT)
Dept: FAMILY MEDICINE CLINIC | Facility: CLINIC | Age: 25
End: 2020-09-10

## 2020-09-10 VITALS — BODY MASS INDEX: 35.82 KG/M2 | WEIGHT: 215 LBS | HEIGHT: 65 IN

## 2020-09-10 DIAGNOSIS — I10 ESSENTIAL HYPERTENSION: Primary | ICD-10-CM

## 2020-09-10 DIAGNOSIS — E78.2 MIXED HYPERLIPIDEMIA: ICD-10-CM

## 2020-09-10 PROCEDURE — 99442 PR PHYS/QHP TELEPHONE EVALUATION 11-20 MIN: CPT | Performed by: FAMILY MEDICINE

## 2020-09-10 NOTE — PROGRESS NOTES
Subjective   Savanah Orozco is a 25 y.o. female.   This visit has been rescheduled as a phone visit to comply with patient safety concerns in accordance with CDC recommendations. Total time of discussion was 15 minutes.    You have chosen to receive care through a telephone visit. Do you consent to use a telephone visit for your medical care today? Yes    With hypertension, following up today.  Had recent lipid panel.  Her cholesterol has gone up some.  She had been being very careful with her diet and has joined a gym and has lost some weight but after the COVID quarantine she has put on even more weight than she lost and feels this certainly has something to do with the cholesterol.    History of Present Illness  Hypertension   This is a chronic problem. The current episode started more than 1 year ago. The problem has been waxing and waning since onset. Associated symptoms include anxiety, headaches and malaise/fatigue. Pertinent negatives include no blurred vision, chest pain, neck pain, orthopnea, palpitations, peripheral edema, PND, shortness of breath or sweats. There are no associated agents to hypertension. Risk factors for coronary artery disease include dyslipidemia and family history. Past treatments include calcium channel blockers, ACE inhibitors, beta-blockers, alpha agonist.  The current treatment provides moderate improvement. There are no compliance problems.      The following portions of the patient's history were reviewed and updated as appropriate: allergies, current medications, past family history, past medical history, past social history, past surgical history and problem list.    Review of Systems   Constitutional: Negative.    HENT: Negative.    Respiratory: Negative.    Cardiovascular: Negative.    Gastrointestinal: Negative.    Musculoskeletal: Negative.  Negative for myalgias.   Skin: Negative.    Allergic/Immunologic: Negative for immunocompromised state.   Neurological: Negative  for dizziness, tremors, seizures, syncope, weakness and numbness.   Hematological: Negative.    Psychiatric/Behavioral: Negative for agitation, confusion, dysphoric mood and sleep disturbance. The patient is not nervous/anxious.    All other systems reviewed and are negative.      Objective   Physical Exam  Lab on 09/04/2020   Component Date Value Ref Range Status   • Free T4 09/04/2020 1.15  0.93 - 1.70 ng/dL Final   • TSH 09/04/2020 1.850  0.270 - 4.200 uIU/mL Final   • Glucose 09/04/2020 106* 70 - 99 mg/dL Final   • BUN 09/04/2020 15  7 - 23 mg/dL Final   • Creatinine 09/04/2020 0.66  0.52 - 1.04 mg/dL Final   • Sodium 09/04/2020 136* 137 - 145 mmol/L Final   • Potassium 09/04/2020 4.3  3.4 - 5.0 mmol/L Final   • Chloride 09/04/2020 106  101 - 112 mmol/L Final   • CO2 09/04/2020 21.0* 22.0 - 30.0 mmol/L Final   • Calcium 09/04/2020 9.2  8.4 - 10.2 mg/dL Final   • Total Protein 09/04/2020 7.7  6.3 - 8.6 g/dL Final   • Albumin 09/04/2020 4.20  3.50 - 5.00 g/dL Final   • ALT (SGPT) 09/04/2020 15  <=35 U/L Final   • AST (SGOT) 09/04/2020 20  14 - 36 U/L Final   • Alkaline Phosphatase 09/04/2020 70  38 - 126 U/L Final   • Total Bilirubin 09/04/2020 0.4  0.2 - 1.3 mg/dL Final   • eGFR Non African Amer 09/04/2020 108  71 - 165 mL/min/1.73 Final   • Globulin 09/04/2020 3.5  2.3 - 3.5 gm/dL Final   • A/G Ratio 09/04/2020 1.2  1.1 - 1.8 g/dL Final   • BUN/Creatinine Ratio 09/04/2020 22.7  7.0 - 25.0 Final   • Anion Gap 09/04/2020 9.0  5.0 - 15.0 mmol/L Final   • Total Cholesterol 09/04/2020 215* 150 - 200 mg/dL Final   • Triglycerides 09/04/2020 92  <=150 mg/dL Final   • HDL Cholesterol 09/04/2020 62* 40 - 59 mg/dL Final   • LDL Cholesterol  09/04/2020 135* <=100 mg/dL Final   • VLDL Cholesterol 09/04/2020 18.4  mg/dL Final   • LDL/HDL Ratio 09/04/2020 2.17  0.00 - 3.22 Final   • WBC 09/04/2020 7.70  3.40 - 10.80 10*3/mm3 Final   • RBC 09/04/2020 4.75  3.77 - 5.28 10*6/mm3 Final   • Hemoglobin 09/04/2020 13.8  12.0 - 15.9  g/dL Final   • Hematocrit 09/04/2020 42.0  34.0 - 46.6 % Final   • MCV 09/04/2020 88.4  79.0 - 97.0 fL Final   • MCH 09/04/2020 29.1  26.6 - 33.0 pg Final   • MCHC 09/04/2020 32.9  31.5 - 35.7 g/dL Final   • RDW 09/04/2020 12.8  12.3 - 15.4 % Final   • RDW-SD 09/04/2020 40.9  37.0 - 54.0 fl Final   • MPV 09/04/2020 10.3  6.0 - 12.0 fL Final   • Platelets 09/04/2020 391  140 - 450 10*3/mm3 Final   • Neutrophil % 09/04/2020 50.4  42.7 - 76.0 % Final   • Lymphocyte % 09/04/2020 37.5  19.6 - 45.3 % Final   • Monocyte % 09/04/2020 8.6  5.0 - 12.0 % Final   • Eosinophil % 09/04/2020 3.0  0.3 - 6.2 % Final   • Basophil % 09/04/2020 0.5  0.0 - 1.5 % Final   • Neutrophils, Absolute 09/04/2020 3.88  1.70 - 7.00 10*3/mm3 Final   • Lymphocytes, Absolute 09/04/2020 2.89  0.70 - 3.10 10*3/mm3 Final   • Monocytes, Absolute 09/04/2020 0.66  0.10 - 0.90 10*3/mm3 Final   • Eosinophils, Absolute 09/04/2020 0.23  0.00 - 0.40 10*3/mm3 Final   • Basophils, Absolute 09/04/2020 0.04  0.00 - 0.20 10*3/mm3 Final   ]  Assessment/Plan   Savanah was seen today for follow-up.    Diagnoses and all orders for this visit:    Essential hypertension    Mixed hyperlipidemia     Patient is very motivated to lose weight and change her diet.  We will give at least 6 months and recheck lipids.  In the meantime she is to continue to monitor her blood pressures at home and will continue the current blood pressure medications.            This document has been electronically signed by Liliana Lowery MD on September 10, 2020 15:00

## 2020-11-12 RX ORDER — METOPROLOL SUCCINATE 50 MG/1
50 TABLET, EXTENDED RELEASE ORAL DAILY
Qty: 30 TABLET | Refills: 0 | Status: SHIPPED | OUTPATIENT
Start: 2020-11-12 | End: 2020-12-28 | Stop reason: SDUPTHER

## 2020-12-28 RX ORDER — METOPROLOL SUCCINATE 50 MG/1
50 TABLET, EXTENDED RELEASE ORAL DAILY
Qty: 30 TABLET | Refills: 5 | Status: SHIPPED | OUTPATIENT
Start: 2020-12-28 | End: 2021-05-19 | Stop reason: SDUPTHER

## 2020-12-28 RX ORDER — AMLODIPINE BESYLATE 5 MG/1
5 TABLET ORAL DAILY
Qty: 30 TABLET | Refills: 5 | Status: SHIPPED | OUTPATIENT
Start: 2020-12-28 | End: 2021-02-20 | Stop reason: SDUPTHER

## 2020-12-28 NOTE — TELEPHONE ENCOUNTER
Patient called and states that she can not get ahold of the office she called ours needing an refill

## 2021-02-04 RX ORDER — CLONIDINE HYDROCHLORIDE 0.1 MG/1
TABLET ORAL
Qty: 60 TABLET | Refills: 5 | Status: SHIPPED | OUTPATIENT
Start: 2021-02-04 | End: 2021-09-23

## 2021-02-21 RX ORDER — AMLODIPINE BESYLATE 5 MG/1
5 TABLET ORAL DAILY
Qty: 30 TABLET | Refills: 5 | Status: SHIPPED | OUTPATIENT
Start: 2021-02-21 | End: 2021-05-19 | Stop reason: SDUPTHER

## 2021-02-21 RX ORDER — BENAZEPRIL HYDROCHLORIDE 20 MG/1
20 TABLET ORAL DAILY
Qty: 30 TABLET | Refills: 5 | Status: SHIPPED | OUTPATIENT
Start: 2021-02-21 | End: 2021-05-19 | Stop reason: SDUPTHER

## 2021-05-19 RX ORDER — BENAZEPRIL HYDROCHLORIDE 20 MG/1
20 TABLET ORAL DAILY
Qty: 30 TABLET | Refills: 5 | Status: SHIPPED | OUTPATIENT
Start: 2021-05-19 | End: 2021-09-23 | Stop reason: SDUPTHER

## 2021-05-19 RX ORDER — AMLODIPINE BESYLATE 5 MG/1
5 TABLET ORAL DAILY
Qty: 30 TABLET | Refills: 5 | Status: SHIPPED | OUTPATIENT
Start: 2021-05-19 | End: 2021-09-23 | Stop reason: SDUPTHER

## 2021-05-19 RX ORDER — METOPROLOL SUCCINATE 50 MG/1
50 TABLET, EXTENDED RELEASE ORAL DAILY
Qty: 30 TABLET | Refills: 5 | Status: SHIPPED | OUTPATIENT
Start: 2021-05-19 | End: 2021-09-23 | Stop reason: SDUPTHER

## 2021-09-14 RX ORDER — NORGESTIMATE AND ETHINYL ESTRADIOL 0.25-0.035
1 KIT ORAL DAILY
Qty: 28 TABLET | Refills: 0 | Status: SHIPPED | OUTPATIENT
Start: 2021-09-14 | End: 2021-09-23 | Stop reason: SDUPTHER

## 2021-09-23 ENCOUNTER — OFFICE VISIT (OUTPATIENT)
Dept: FAMILY MEDICINE CLINIC | Facility: CLINIC | Age: 26
End: 2021-09-23

## 2021-09-23 VITALS
OXYGEN SATURATION: 98 % | TEMPERATURE: 98.9 F | SYSTOLIC BLOOD PRESSURE: 112 MMHG | HEIGHT: 65 IN | WEIGHT: 207 LBS | HEART RATE: 80 BPM | DIASTOLIC BLOOD PRESSURE: 70 MMHG | BODY MASS INDEX: 34.49 KG/M2

## 2021-09-23 DIAGNOSIS — Z30.41 ENCOUNTER FOR SURVEILLANCE OF CONTRACEPTIVE PILLS: ICD-10-CM

## 2021-09-23 DIAGNOSIS — E78.2 MIXED HYPERLIPIDEMIA: ICD-10-CM

## 2021-09-23 DIAGNOSIS — I10 ESSENTIAL HYPERTENSION: Primary | ICD-10-CM

## 2021-09-23 PROCEDURE — 99214 OFFICE O/P EST MOD 30 MIN: CPT | Performed by: FAMILY MEDICINE

## 2021-09-23 RX ORDER — AMLODIPINE BESYLATE 5 MG/1
5 TABLET ORAL DAILY
Qty: 30 TABLET | Refills: 5 | Status: SHIPPED | OUTPATIENT
Start: 2021-09-23 | End: 2021-11-18 | Stop reason: SDUPTHER

## 2021-09-23 RX ORDER — METOPROLOL SUCCINATE 50 MG/1
50 TABLET, EXTENDED RELEASE ORAL DAILY
Qty: 30 TABLET | Refills: 5 | Status: SHIPPED | OUTPATIENT
Start: 2021-09-23 | End: 2021-11-18 | Stop reason: SDUPTHER

## 2021-09-23 RX ORDER — NORGESTIMATE AND ETHINYL ESTRADIOL 0.25-0.035
1 KIT ORAL DAILY
Qty: 28 TABLET | Refills: 5 | Status: SHIPPED | OUTPATIENT
Start: 2021-09-23 | End: 2022-03-24 | Stop reason: SDUPTHER

## 2021-09-23 RX ORDER — BENAZEPRIL HYDROCHLORIDE 20 MG/1
20 TABLET ORAL DAILY
Qty: 30 TABLET | Refills: 5 | Status: SHIPPED | OUTPATIENT
Start: 2021-09-23 | End: 2021-11-18 | Stop reason: SDUPTHER

## 2021-09-23 RX ORDER — CLONIDINE HYDROCHLORIDE 0.1 MG/1
TABLET ORAL
Qty: 60 TABLET | Refills: 5 | Status: SHIPPED | OUTPATIENT
Start: 2021-09-23

## 2021-09-23 NOTE — PROGRESS NOTES
"Subjective   Savaanh Kanwal Orozco is a 26 y.o. female who presents to the office to follow-up on hypertension, hyperlipidemia.  She is due for some labs.  Generally feels well and has no complaints.  Blood pressures have been well controlled.  Also her gynecologist has relocated and she wondered if I could take over prescribing of her birth control pills.  She is having no issues with them, menstrual cycles are regular    History of Present Illness   Hypertension   This is a new problem.  The problem has been waxing and waning since onset. Associated symptoms include anxiety, headaches and malaise/fatigue. Pertinent negatives include no blurred vision, chest pain, neck pain, orthopnea, palpitations, peripheral edema, PND, shortness of breath or sweats. There are no associated agents to hypertension. Risk factors for coronary artery disease include dyslipidemia and family history.     The following portions of the patient's history were reviewed and updated as appropriate: allergies, current medications, past family history, past medical history, past social history, past surgical history and problem list.    Review of Systems   Constitutional: Negative.    HENT: Negative.    Respiratory: Negative.    Cardiovascular: Negative.    Gastrointestinal: Negative.    Musculoskeletal: Negative.  Negative for myalgias.   Skin: Negative.    Allergic/Immunologic: Negative for immunocompromised state.   Neurological: Negative for dizziness, tremors, seizures, syncope, weakness and numbness.   Hematological: Negative.    Psychiatric/Behavioral: Negative for agitation, confusion, dysphoric mood and sleep disturbance. The patient is not nervous/anxious.    All other systems reviewed and are negative.      Objective    Vitals:    09/23/21 1419   BP: 112/70   Pulse: 80   Temp: 98.9 °F (37.2 °C)   TempSrc: Tympanic   SpO2: 98%   Weight: 93.9 kg (207 lb)   Height: 165.1 cm (65\")   PainSc: 0-No pain     Body mass index is 34.45 " kg/m².    Physical Exam   Constitutional: She is oriented to person, place, and time. She appears well-developed.   HENT:   Head: Normocephalic and atraumatic.   Nose: Nose normal.   Eyes: Pupils are equal, round, and reactive to light. Conjunctivae are normal.   Neck: No JVD present. No tracheal deviation present. No thyromegaly present.   Cardiovascular: Normal rate, regular rhythm and normal heart sounds.   No murmur heard.  Pulmonary/Chest: Effort normal and breath sounds normal. She has no wheezes.   Abdominal: Soft. Bowel sounds are normal. She exhibits no distension. There is no abdominal tenderness.   Musculoskeletal: Normal range of motion.      Comments:      Lymphadenopathy:     She has no cervical adenopathy.   Neurological: She is alert and oriented to person, place, and time. Coordination normal.   Skin: Skin is warm and dry. No rash noted.   Nursing note and vitals reviewed.      Assessment/Plan   Diagnoses and all orders for this visit:    1. Essential hypertension (Primary)  -     metoprolol succinate XL (TOPROL-XL) 50 MG 24 hr tablet; Take 1 tablet by mouth Daily.  Dispense: 30 tablet; Refill: 5  -     cloNIDine (Catapres) 0.1 MG tablet; 1 tablet up to twice daily prn systolic blood pressure >100  Dispense: 60 tablet; Refill: 5  -     benazepril (LOTENSIN) 20 MG tablet; Take 1 tablet by mouth Daily.  Dispense: 30 tablet; Refill: 5  -     amLODIPine (NORVASC) 5 MG tablet; Take 1 tablet by mouth Daily.  Dispense: 30 tablet; Refill: 5  -     CBC & Differential; Future  -     Comprehensive Metabolic Panel    2. Mixed hyperlipidemia  -     Lipid Panel; Future  -     T4, Free  -     TSH    3. Encounter for surveillance of contraceptive pills  -     norgestimate-ethinyl estradiol (MonoNessa) 0.25-35 MG-MCG per tablet; Take 1 tablet by mouth Daily.  Dispense: 28 tablet; Refill: 5    Continue above meds for hypertension    Return fasting for labs as above    Continue with the current oral contraception.     Discussed the patient's BMI with her. BMI is above normal parameters. Follow-up plan includes:  educational material and nutrition counseling.          This document has been electronically signed by Liliana Lowery MD on September 23, 2021 14:27 CDT

## 2021-11-18 DIAGNOSIS — I10 ESSENTIAL HYPERTENSION: ICD-10-CM

## 2021-11-18 RX ORDER — METOPROLOL SUCCINATE 50 MG/1
50 TABLET, EXTENDED RELEASE ORAL DAILY
Qty: 30 TABLET | Refills: 5 | Status: SHIPPED | OUTPATIENT
Start: 2021-11-18 | End: 2022-05-09

## 2021-11-18 RX ORDER — BENAZEPRIL HYDROCHLORIDE 20 MG/1
20 TABLET ORAL DAILY
Qty: 30 TABLET | Refills: 5 | Status: SHIPPED | OUTPATIENT
Start: 2021-11-18 | End: 2022-05-09

## 2021-11-18 RX ORDER — AMLODIPINE BESYLATE 5 MG/1
5 TABLET ORAL DAILY
Qty: 30 TABLET | Refills: 5 | Status: SHIPPED | OUTPATIENT
Start: 2021-11-18 | End: 2022-05-09

## 2022-03-24 DIAGNOSIS — Z30.41 ENCOUNTER FOR SURVEILLANCE OF CONTRACEPTIVE PILLS: ICD-10-CM

## 2022-03-24 RX ORDER — NORGESTIMATE AND ETHINYL ESTRADIOL 0.25-0.035
1 KIT ORAL DAILY
Qty: 28 TABLET | Refills: 5 | Status: SHIPPED | OUTPATIENT
Start: 2022-03-24 | End: 2022-09-08

## 2022-05-09 DIAGNOSIS — I10 ESSENTIAL HYPERTENSION: ICD-10-CM

## 2022-05-09 RX ORDER — BENAZEPRIL HYDROCHLORIDE 20 MG/1
TABLET ORAL
Qty: 30 TABLET | Refills: 0 | Status: SHIPPED | OUTPATIENT
Start: 2022-05-09 | End: 2022-06-24 | Stop reason: SDUPTHER

## 2022-05-09 RX ORDER — METOPROLOL SUCCINATE 50 MG/1
TABLET, EXTENDED RELEASE ORAL
Qty: 30 TABLET | Refills: 0 | Status: SHIPPED | OUTPATIENT
Start: 2022-05-09 | End: 2022-06-24 | Stop reason: SDUPTHER

## 2022-05-09 RX ORDER — AMLODIPINE BESYLATE 5 MG/1
TABLET ORAL
Qty: 30 TABLET | Refills: 0 | Status: SHIPPED | OUTPATIENT
Start: 2022-05-09 | End: 2022-06-24 | Stop reason: SDUPTHER

## 2022-06-24 DIAGNOSIS — I10 ESSENTIAL HYPERTENSION: ICD-10-CM

## 2022-06-28 RX ORDER — BENAZEPRIL HYDROCHLORIDE 20 MG/1
20 TABLET ORAL DAILY
Qty: 15 TABLET | Refills: 0 | Status: SHIPPED | OUTPATIENT
Start: 2022-06-28 | End: 2022-07-06 | Stop reason: SDUPTHER

## 2022-06-28 RX ORDER — METOPROLOL SUCCINATE 50 MG/1
50 TABLET, EXTENDED RELEASE ORAL DAILY
Qty: 15 TABLET | Refills: 0 | Status: SHIPPED | OUTPATIENT
Start: 2022-06-28 | End: 2022-07-06 | Stop reason: SDUPTHER

## 2022-06-28 RX ORDER — AMLODIPINE BESYLATE 5 MG/1
5 TABLET ORAL DAILY
Qty: 15 TABLET | Refills: 0 | Status: SHIPPED | OUTPATIENT
Start: 2022-06-28 | End: 2022-07-06 | Stop reason: SDUPTHER

## 2022-07-05 ENCOUNTER — TELEPHONE (OUTPATIENT)
Dept: FAMILY MEDICINE CLINIC | Facility: CLINIC | Age: 27
End: 2022-07-05

## 2022-07-05 DIAGNOSIS — I10 ESSENTIAL HYPERTENSION: ICD-10-CM

## 2022-07-06 RX ORDER — AMLODIPINE BESYLATE 5 MG/1
5 TABLET ORAL DAILY
Qty: 30 TABLET | Refills: 0 | Status: SHIPPED | OUTPATIENT
Start: 2022-07-06 | End: 2022-08-04

## 2022-07-06 RX ORDER — BENAZEPRIL HYDROCHLORIDE 20 MG/1
20 TABLET ORAL DAILY
Qty: 30 TABLET | Refills: 0 | Status: SHIPPED | OUTPATIENT
Start: 2022-07-06 | End: 2022-08-04

## 2022-07-06 RX ORDER — METOPROLOL SUCCINATE 50 MG/1
50 TABLET, EXTENDED RELEASE ORAL DAILY
Qty: 30 TABLET | Refills: 0 | Status: SHIPPED | OUTPATIENT
Start: 2022-07-06 | End: 2022-08-04

## 2022-08-04 DIAGNOSIS — I10 ESSENTIAL HYPERTENSION: ICD-10-CM

## 2022-08-04 RX ORDER — AMLODIPINE BESYLATE 5 MG/1
TABLET ORAL
Qty: 14 TABLET | Refills: 0 | Status: SHIPPED | OUTPATIENT
Start: 2022-08-04 | End: 2022-08-06 | Stop reason: SDUPTHER

## 2022-08-04 RX ORDER — BENAZEPRIL HYDROCHLORIDE 20 MG/1
TABLET ORAL
Qty: 14 TABLET | Refills: 0 | Status: SHIPPED | OUTPATIENT
Start: 2022-08-04 | End: 2022-08-06 | Stop reason: SDUPTHER

## 2022-08-04 RX ORDER — METOPROLOL SUCCINATE 50 MG/1
TABLET, EXTENDED RELEASE ORAL
Qty: 14 TABLET | Refills: 0 | Status: SHIPPED | OUTPATIENT
Start: 2022-08-04 | End: 2022-08-06 | Stop reason: SDUPTHER

## 2022-08-06 DIAGNOSIS — I10 ESSENTIAL HYPERTENSION: ICD-10-CM

## 2022-08-06 RX ORDER — BENAZEPRIL HYDROCHLORIDE 20 MG/1
20 TABLET ORAL DAILY
Qty: 90 TABLET | Refills: 1 | Status: SHIPPED | OUTPATIENT
Start: 2022-08-06 | End: 2022-10-13 | Stop reason: SDUPTHER

## 2022-08-06 RX ORDER — AMLODIPINE BESYLATE 5 MG/1
5 TABLET ORAL DAILY
Qty: 14 TABLET | Refills: 0 | Status: SHIPPED | OUTPATIENT
Start: 2022-08-06 | End: 2022-08-08 | Stop reason: SDUPTHER

## 2022-08-06 RX ORDER — METOPROLOL SUCCINATE 50 MG/1
50 TABLET, EXTENDED RELEASE ORAL DAILY
Qty: 14 TABLET | Refills: 0 | Status: SHIPPED | OUTPATIENT
Start: 2022-08-06 | End: 2022-08-08 | Stop reason: SDUPTHER

## 2022-08-08 ENCOUNTER — TELEPHONE (OUTPATIENT)
Dept: FAMILY MEDICINE CLINIC | Facility: CLINIC | Age: 27
End: 2022-08-08

## 2022-08-08 DIAGNOSIS — I10 ESSENTIAL HYPERTENSION: ICD-10-CM

## 2022-08-08 RX ORDER — METOPROLOL SUCCINATE 50 MG/1
50 TABLET, EXTENDED RELEASE ORAL DAILY
Qty: 30 TABLET | Refills: 0 | Status: SHIPPED | OUTPATIENT
Start: 2022-08-08 | End: 2022-10-06

## 2022-08-08 RX ORDER — AMLODIPINE BESYLATE 5 MG/1
5 TABLET ORAL DAILY
Qty: 30 TABLET | Refills: 0 | Status: SHIPPED | OUTPATIENT
Start: 2022-08-08 | End: 2022-10-06

## 2022-09-08 DIAGNOSIS — Z30.41 ENCOUNTER FOR SURVEILLANCE OF CONTRACEPTIVE PILLS: ICD-10-CM

## 2022-09-08 RX ORDER — NORGESTIMATE AND ETHINYL ESTRADIOL 0.25-0.035
KIT ORAL
Qty: 28 TABLET | Refills: 1 | Status: SHIPPED | OUTPATIENT
Start: 2022-09-08 | End: 2022-10-10

## 2022-09-08 NOTE — TELEPHONE ENCOUNTER
Patient due for appointment for further refill. Contacted the patient and informed her of the message. Appointment was scheduled 10/13/2022. Supply will be given until appointment.

## 2022-10-05 DIAGNOSIS — I10 ESSENTIAL HYPERTENSION: ICD-10-CM

## 2022-10-06 RX ORDER — AMLODIPINE BESYLATE 5 MG/1
TABLET ORAL
Qty: 30 TABLET | Refills: 0 | Status: SHIPPED | OUTPATIENT
Start: 2022-10-06 | End: 2022-10-13 | Stop reason: SDUPTHER

## 2022-10-06 RX ORDER — METOPROLOL SUCCINATE 50 MG/1
TABLET, EXTENDED RELEASE ORAL
Qty: 30 TABLET | Refills: 0 | Status: SHIPPED | OUTPATIENT
Start: 2022-10-06 | End: 2022-10-13 | Stop reason: SDUPTHER

## 2022-10-10 DIAGNOSIS — Z30.41 ENCOUNTER FOR SURVEILLANCE OF CONTRACEPTIVE PILLS: ICD-10-CM

## 2022-10-10 RX ORDER — NORGESTIMATE AND ETHINYL ESTRADIOL 0.25-0.035
KIT ORAL
Qty: 28 TABLET | Refills: 1 | Status: SHIPPED | OUTPATIENT
Start: 2022-10-10 | End: 2022-10-13 | Stop reason: SDUPTHER

## 2022-10-13 ENCOUNTER — OFFICE VISIT (OUTPATIENT)
Dept: FAMILY MEDICINE CLINIC | Facility: CLINIC | Age: 27
End: 2022-10-13

## 2022-10-13 ENCOUNTER — LAB (OUTPATIENT)
Dept: LAB | Facility: OTHER | Age: 27
End: 2022-10-13

## 2022-10-13 VITALS
OXYGEN SATURATION: 99 % | RESPIRATION RATE: 16 BRPM | BODY MASS INDEX: 30.92 KG/M2 | DIASTOLIC BLOOD PRESSURE: 78 MMHG | TEMPERATURE: 98.2 F | SYSTOLIC BLOOD PRESSURE: 128 MMHG | HEIGHT: 65 IN | WEIGHT: 185.6 LBS | HEART RATE: 86 BPM

## 2022-10-13 DIAGNOSIS — E78.2 MIXED HYPERLIPIDEMIA: ICD-10-CM

## 2022-10-13 DIAGNOSIS — I10 ESSENTIAL HYPERTENSION: Primary | ICD-10-CM

## 2022-10-13 DIAGNOSIS — Z30.41 ENCOUNTER FOR SURVEILLANCE OF CONTRACEPTIVE PILLS: ICD-10-CM

## 2022-10-13 DIAGNOSIS — I10 ESSENTIAL HYPERTENSION: ICD-10-CM

## 2022-10-13 DIAGNOSIS — E66.9 OBESITY, CLASS I, BMI 30-34.9: ICD-10-CM

## 2022-10-13 LAB
ALBUMIN SERPL-MCNC: 4 G/DL (ref 3.5–5)
ALBUMIN/GLOB SERPL: 1.4 G/DL (ref 1.1–1.8)
ALP SERPL-CCNC: 60 U/L (ref 38–126)
ALT SERPL W P-5'-P-CCNC: 17 U/L
ANION GAP SERPL CALCULATED.3IONS-SCNC: 3 MMOL/L (ref 5–15)
AST SERPL-CCNC: 23 U/L (ref 14–36)
BASOPHILS # BLD AUTO: 0.04 10*3/MM3 (ref 0–0.2)
BASOPHILS NFR BLD AUTO: 0.7 % (ref 0–1.5)
BILIRUB SERPL-MCNC: 0.8 MG/DL (ref 0.2–1.3)
BUN SERPL-MCNC: 10 MG/DL (ref 7–23)
BUN/CREAT SERPL: 13.3 (ref 7–25)
CALCIUM SPEC-SCNC: 8.7 MG/DL (ref 8.4–10.2)
CHLORIDE SERPL-SCNC: 105 MMOL/L (ref 101–112)
CHOLEST SERPL-MCNC: 185 MG/DL (ref 150–200)
CO2 SERPL-SCNC: 28 MMOL/L (ref 22–30)
CREAT SERPL-MCNC: 0.75 MG/DL (ref 0.52–1.04)
DEPRECATED RDW RBC AUTO: 40.4 FL (ref 37–54)
EGFRCR SERPLBLD CKD-EPI 2021: 111.4 ML/MIN/1.73
EOSINOPHIL # BLD AUTO: 0.34 10*3/MM3 (ref 0–0.4)
EOSINOPHIL NFR BLD AUTO: 6.2 % (ref 0.3–6.2)
ERYTHROCYTE [DISTWIDTH] IN BLOOD BY AUTOMATED COUNT: 12.4 % (ref 12.3–15.4)
GLOBULIN UR ELPH-MCNC: 2.8 GM/DL (ref 2.3–3.5)
GLUCOSE SERPL-MCNC: 93 MG/DL (ref 70–99)
HCT VFR BLD AUTO: 41 % (ref 34–46.6)
HDLC SERPL-MCNC: 58 MG/DL (ref 40–59)
HGB BLD-MCNC: 13.1 G/DL (ref 12–15.9)
LDLC SERPL CALC-MCNC: 110 MG/DL
LDLC/HDLC SERPL: 1.88 {RATIO} (ref 0–3.22)
LYMPHOCYTES # BLD AUTO: 2.45 10*3/MM3 (ref 0.7–3.1)
LYMPHOCYTES NFR BLD AUTO: 44.4 % (ref 19.6–45.3)
MCH RBC QN AUTO: 28.7 PG (ref 26.6–33)
MCHC RBC AUTO-ENTMCNC: 32 G/DL (ref 31.5–35.7)
MCV RBC AUTO: 89.9 FL (ref 79–97)
MONOCYTES # BLD AUTO: 0.52 10*3/MM3 (ref 0.1–0.9)
MONOCYTES NFR BLD AUTO: 9.4 % (ref 5–12)
NEUTROPHILS NFR BLD AUTO: 2.17 10*3/MM3 (ref 1.7–7)
NEUTROPHILS NFR BLD AUTO: 39.3 % (ref 42.7–76)
PLATELET # BLD AUTO: 334 10*3/MM3 (ref 140–450)
PMV BLD AUTO: 9.8 FL (ref 6–12)
POTASSIUM SERPL-SCNC: 4.3 MMOL/L (ref 3.4–5)
PROT SERPL-MCNC: 6.8 G/DL (ref 6.3–8.6)
RBC # BLD AUTO: 4.56 10*6/MM3 (ref 3.77–5.28)
SODIUM SERPL-SCNC: 136 MMOL/L (ref 137–145)
TRIGL SERPL-MCNC: 91 MG/DL
VLDLC SERPL-MCNC: 17 MG/DL (ref 5–40)
WBC NRBC COR # BLD: 5.52 10*3/MM3 (ref 3.4–10.8)

## 2022-10-13 PROCEDURE — 84443 ASSAY THYROID STIM HORMONE: CPT | Performed by: FAMILY MEDICINE

## 2022-10-13 PROCEDURE — 85025 COMPLETE CBC W/AUTO DIFF WBC: CPT | Performed by: FAMILY MEDICINE

## 2022-10-13 PROCEDURE — 80061 LIPID PANEL: CPT | Performed by: FAMILY MEDICINE

## 2022-10-13 PROCEDURE — 80053 COMPREHEN METABOLIC PANEL: CPT | Performed by: FAMILY MEDICINE

## 2022-10-13 PROCEDURE — 84439 ASSAY OF FREE THYROXINE: CPT | Performed by: FAMILY MEDICINE

## 2022-10-13 PROCEDURE — 36415 COLL VENOUS BLD VENIPUNCTURE: CPT | Performed by: FAMILY MEDICINE

## 2022-10-13 PROCEDURE — 99214 OFFICE O/P EST MOD 30 MIN: CPT | Performed by: FAMILY MEDICINE

## 2022-10-13 RX ORDER — BENAZEPRIL HYDROCHLORIDE 20 MG/1
20 TABLET ORAL DAILY
Qty: 90 TABLET | Refills: 3 | Status: SHIPPED | OUTPATIENT
Start: 2022-10-13 | End: 2023-02-27

## 2022-10-13 RX ORDER — NORGESTIMATE AND ETHINYL ESTRADIOL 0.25-0.035
1 KIT ORAL DAILY
Qty: 84 EACH | Refills: 3 | Status: SHIPPED | OUTPATIENT
Start: 2022-10-13

## 2022-10-13 RX ORDER — METOPROLOL SUCCINATE 50 MG/1
50 TABLET, EXTENDED RELEASE ORAL DAILY
Qty: 90 TABLET | Refills: 3 | Status: SHIPPED | OUTPATIENT
Start: 2022-10-13

## 2022-10-13 RX ORDER — AMLODIPINE BESYLATE 5 MG/1
5 TABLET ORAL DAILY
Qty: 90 TABLET | Refills: 3 | Status: SHIPPED | OUTPATIENT
Start: 2022-10-13 | End: 2023-02-27

## 2022-10-13 NOTE — PROGRESS NOTES
Subjective   Savanah Orozco is a 27 y.o. female here today for follow-up on hypertension, hyperlipidemia, refill of medications.  She is on several blood pressure medications and even made a lot of lifestyle changes including losing weight but blood pressure does did not seem to come down.  There is a very strong family history of hypertension.  She is also due for labs to recheck cholesterol which was high.    Needs a refill of her birth control pills.      History of Present Illness   Hypertension   This is a new problem.  The problem has been waxing and waning since onset. Associated symptoms include anxiety, headaches and malaise/fatigue. Pertinent negatives include no blurred vision, chest pain, neck pain, orthopnea, palpitations, peripheral edema, PND, shortness of breath or sweats. There are no associated agents to hypertension. Risk factors for coronary artery disease include dyslipidemia and family history.     The following portions of the patient's history were reviewed and updated as appropriate: allergies, current medications, past family history, past medical history, past social history, past surgical history and problem list.    Review of Systems   Constitutional: Negative.    HENT: Negative.    Respiratory: Negative.    Cardiovascular: Negative.    Gastrointestinal: Negative.    Musculoskeletal: Negative.  Negative for myalgias.   Skin: Negative.    Allergic/Immunologic: Negative for immunocompromised state.   Neurological: Negative for dizziness, tremors, seizures, syncope, weakness and numbness.   Hematological: Negative.    Psychiatric/Behavioral: Negative for agitation, confusion, dysphoric mood and sleep disturbance. The patient is not nervous/anxious.    All other systems reviewed and are negative.      Objective    Vitals:    10/13/22 0858   BP: 128/78   BP Location: Left arm   Patient Position: Sitting   Cuff Size: Adult   Pulse: 86   Resp: 16   Temp: 98.2 °F (36.8 °C)   SpO2: 99%  "  Weight: 84.2 kg (185 lb 9.6 oz)   Height: 165.1 cm (65\")   PainSc: 0-No pain     Body mass index is 30.89 kg/m².    Physical Exam   Constitutional: She is oriented to person, place, and time. She appears well-developed.   HENT:   Head: Normocephalic and atraumatic.   Nose: Nose normal.   Eyes: Pupils are equal, round, and reactive to light. Conjunctivae are normal.   Neck: No JVD present. No tracheal deviation present. No thyromegaly present.   Cardiovascular: Normal rate, regular rhythm and normal heart sounds.   No murmur heard.  Pulmonary/Chest: Effort normal and breath sounds normal. She has no wheezes.   Abdominal: Soft. Bowel sounds are normal. She exhibits no distension. There is no abdominal tenderness.   Musculoskeletal: Normal range of motion.      Comments:      Lymphadenopathy:     She has no cervical adenopathy.   Neurological: She is alert and oriented to person, place, and time. Coordination normal.   Skin: Skin is warm and dry. No rash noted.   Nursing note and vitals reviewed.      Assessment & Plan   Diagnoses and all orders for this visit:    1. Essential hypertension (Primary)  -     amLODIPine (NORVASC) 5 MG tablet; Take 1 tablet by mouth Daily.  Dispense: 90 tablet; Refill: 3  -     benazepril (LOTENSIN) 20 MG tablet; Take 1 tablet by mouth Daily.  Dispense: 90 tablet; Refill: 3  -     metoprolol succinate XL (TOPROL-XL) 50 MG 24 hr tablet; Take 1 tablet by mouth Daily.  Dispense: 90 tablet; Refill: 3  -     Comprehensive Metabolic Panel  -     CBC & Differential; Future  -     T4, Free  -     TSH    2. Encounter for surveillance of contraceptive pills  -     norgestimate-ethinyl estradiol (Estarylla) 0.25-35 MG-MCG per tablet; Take 1 tablet by mouth Daily.  Dispense: 84 each; Refill: 3    3. Mixed hyperlipidemia  -     Lipid Panel; Future    4. Obesity, Class I, BMI 30-34.9    Continue current medications for hypertension and continue to monitor blood pressures regularly with goal of 130/80 " or less    Will get labs fasting for lipids and address once results are in    Continue current oral contraceptive      Discussed the patient's BMI with her. BMI is above normal parameters. Follow-up plan includes:  educational material and nutrition counseling.          This document has been electronically signed by Liliana Lowery MD on October 13, 2022 09:27 CDT

## 2022-10-14 LAB
T4 FREE SERPL-MCNC: 1.21 NG/DL (ref 0.93–1.7)
TSH SERPL DL<=0.05 MIU/L-ACNC: 1.31 UIU/ML (ref 0.27–4.2)

## 2022-12-01 DIAGNOSIS — Z30.41 ENCOUNTER FOR SURVEILLANCE OF CONTRACEPTIVE PILLS: ICD-10-CM

## 2022-12-01 RX ORDER — NORGESTIMATE AND ETHINYL ESTRADIOL 0.25-0.035
KIT ORAL
Qty: 28 TABLET | Refills: 1 | OUTPATIENT
Start: 2022-12-01

## 2023-02-27 RX ORDER — AMLODIPINE BESYLATE AND BENAZEPRIL HYDROCHLORIDE 5; 20 MG/1; MG/1
1 CAPSULE ORAL DAILY
Qty: 90 CAPSULE | Refills: 3 | Status: SHIPPED | OUTPATIENT
Start: 2023-02-27

## 2023-06-15 ENCOUNTER — OFFICE VISIT (OUTPATIENT)
Dept: OBSTETRICS AND GYNECOLOGY | Facility: CLINIC | Age: 28
End: 2023-06-15
Payer: COMMERCIAL

## 2023-06-15 VITALS
BODY MASS INDEX: 32.32 KG/M2 | SYSTOLIC BLOOD PRESSURE: 122 MMHG | DIASTOLIC BLOOD PRESSURE: 78 MMHG | HEIGHT: 65 IN | WEIGHT: 194 LBS

## 2023-06-15 DIAGNOSIS — Z12.4 ENCOUNTER FOR PAPANICOLAOU SMEAR FOR CERVICAL CANCER SCREENING: ICD-10-CM

## 2023-06-15 DIAGNOSIS — Z76.89 ENCOUNTER TO ESTABLISH CARE WITH NEW DOCTOR: Primary | ICD-10-CM

## 2023-06-15 DIAGNOSIS — Z80.3 FAMILY HISTORY OF BREAST CANCER: ICD-10-CM

## 2023-06-15 NOTE — PROGRESS NOTES
Gynecology  Date of Service: 06/15/2023    CC: Establish care    HPI  Savanah Orozco is a 28 y.o.  premenopausal female who presents to establish [facility].      Patient has no concerns.  Denies any vaginal itching, burning, irritation, or discharge. Denies any abnormal uterine bleeding. Denies any sexual dysfunction concerns. Denies any urinary symptoms including incontinence, dysuria, frequency, urgency, nocturia.  She is on BCP, which is filled by her PCP.    Her mother is Cherrie Ríos, who has breast cancer.  She is a  for a dentist in [place].    ROS  Review of Systems   Constitutional: Negative.    HENT: Negative.     Eyes: Negative.    Respiratory: Negative.     Cardiovascular: Negative.    Gastrointestinal: Negative.    Endocrine: Negative.    Genitourinary: Negative.    Musculoskeletal: Negative.    Skin: Negative.    Allergic/Immunologic: Negative.    Neurological: Negative.    Hematological: Negative.    Psychiatric/Behavioral: Negative.       GYN HISTORY  Menarche: age unknown  Menses: Regular, every 28 days, lasts 4 days, normal flow, no intermenstrual bleeding  History of STIs: None  Last pap smear: 2019  Abnormal pap smear history: None  Contraception: OCPs     OB HISTORY  OB History    Para Term  AB Living   0 0 0 0 0 0   SAB IAB Ectopic Molar Multiple Live Births   0 0 0 0 0 0     PAST MEDICAL HISTORY  Past Medical History:   Diagnosis Date    Anxiety     Asthma     Essential hypertension 2018    History of kidney stones 2018    Migraine     Mixed hyperlipidemia 09/10/2020     PAST SURGICAL HISTORY  Past Surgical History:   Procedure Laterality Date    NO PAST SURGERIES       FAMILY HISTORY  Family History   Problem Relation Age of Onset    Hyperlipidemia Father     Hypertension Father     Diabetes Father     Hyperlipidemia Mother     Hypertension Mother     Breast cancer Mother 52    Ovarian cancer Neg Hx      "Uterine cancer Neg Hx     Colon cancer Neg Hx      SOCIAL HISTORY  Social History     Socioeconomic History    Marital status: Single   Tobacco Use    Smoking status: Never    Smokeless tobacco: Never   Substance and Sexual Activity    Alcohol use: No    Drug use: No    Sexual activity: Not Currently     Birth control/protection: Birth control pill     ALLERGIES  Allergies   Allergen Reactions    Penicillins Angioedema     HOME MEDICATIONS  Prior to Admission medications    Medication Sig Start Date End Date Taking? Authorizing Provider   amLODIPine-benazepril (Lotrel) 5-20 MG per capsule Take 1 capsule by mouth Daily. 2/27/23   Liliana Lowery MD   cloNIDine (Catapres) 0.1 MG tablet 1 tablet up to twice daily prn systolic blood pressure >100 9/23/21   Lilinaa Lowery MD   metoprolol succinate XL (TOPROL-XL) 50 MG 24 hr tablet Take 1 tablet by mouth Daily. 10/13/22   Liliana Lowery MD   norgestimate-ethinyl estradiol (Estarylla) 0.25-35 MG-MCG per tablet Take 1 tablet by mouth Daily. 10/13/22   Liliana Lowery MD     PE  /78 (BP Location: Left arm, Patient Position: Sitting, Cuff Size: Adult)   Ht 165.1 cm (65\")   Wt 88 kg (194 lb)   LMP 05/23/2023 (Within Days)   BMI 32.28 kg/m²        General: Alert, healthy, no distress, well nourished and well developed.  Neurologic: Alert, oriented to person, place, and time.  Gait normal.  Cranial nerves II-XII grossly intact.  HEENT: Normocephalic, atraumatic.  Extraocular muscles intact, pupils equal and reactive times two.    Neck: Supple, no adenopathy, thyroid normal size, non-tender, without nodularity, trachea midline.  Breasts: Declined.  Lungs: Normal respiratory effort.  Clear to auscultation bilaterally.  No wheezes, rhonci, or rales.  Heart: Regular rate and rhythm.  No murmer, rub or gallop.  Abdomen: Soft, non-tender, non-distended,no masses, no hepatosplenomegaly, no hernia.  Skin: No rash, no lesions.  Extremities: No cyanosis, clubbing or " edema.  PELVIC EXAM:  External Genitalia/Vulva: Anatomy is normal, no significant redness of labia, no discharge on vulvar tissues, Calwa's and Bartholin's glands are normal, no ulcers, no condylomatous lesions.  Urethral meatus: Normal, no lesions, no prolapse.  Urethra: Normal, no masses, no tenderness with palpation.  Bladder: Normal, no fullness, no masses, no tenderness with palpation.  Vagina: Vaginal tissues are not inflamed, normal color and texture, no significant discharge present.  Pelvic support adequate.  Cervix: Normal, no lesions, no purulent discharge, no cervical motion tenderness.  Pap smear obtained.  Uterus: Normal size, shape, and consistency.  Good mobility noted.  Minimal descent noted with good support.  Adnexa: Normal size and shape bilaterally, no palpable mass bilaterally and non-tender bilaterally.  Rectal: Normal, no masses or polyps, confirms bimanual exam, perianal normal, no lesions; KIP deferred.    IMPRESSION  Savanah Orozco is a 28 y.o.  presenting to establish care.    PLAN    1. Encounter to establish care with new doctor  - Reviewed that if she would like to conceive or come off her BCP, recommend changing BP meds as the benazepril can be linked to fetal kidney anomalies.    2. Encounter for Papanicolaou smear for cervical cancer screening  - LIQUID-BASED PAP SMEAR WITH HPV GENOTYPING IF ASCUS ([facility]); Future  - LIQUID-BASED PAP SMEAR WITH HPV GENOTYPING IF ASCUS ([facility])    3. Family history of breast cancer  Mammograms recommended at age 40 (mother was diagnosed at age 52).         This document has been electronically signed by Chantal Arellano MD on Anu 15, 2023 15:10 CDT.

## 2023-06-19 ENCOUNTER — PATIENT ROUNDING (BHMG ONLY) (OUTPATIENT)
Dept: OBSTETRICS AND GYNECOLOGY | Facility: CLINIC | Age: 28
End: 2023-06-19
Payer: COMMERCIAL

## 2023-06-19 LAB — REF LAB TEST METHOD: NORMAL

## 2023-06-19 NOTE — PROGRESS NOTES
June 19, 2023    Hello, may I speak with Savanah Orozco?    My name is Rosangela George CSA      I am  with [address]  [facility] [address] [phone].    Before we get started may I verify your date of birth? 1995    I am calling to officially welcome you to our practice and ask about your recent visit. Is this a good time to talk? yes    Tell me about your visit with us. What things went well?   Patient stated that her appointment with Dr. Arellano went very good. She stated that Dr. Arellano was very nice.        We're always looking for ways to make our patients' experiences even better. Do you have recommendations on ways we may improve?  no    Overall were you satisfied with your first visit to our practice? Yes, patient stated she was very satisfied with visit.        I appreciate you taking the time to speak with me today. Is there anything else I can do for you? no      Thank you, and have a great day.

## 2023-08-02 DIAGNOSIS — I10 ESSENTIAL HYPERTENSION: ICD-10-CM

## 2023-08-02 RX ORDER — METOPROLOL SUCCINATE 50 MG/1
50 TABLET, EXTENDED RELEASE ORAL DAILY
Qty: 90 TABLET | Refills: 0 | Status: SHIPPED | OUTPATIENT
Start: 2023-08-02